# Patient Record
Sex: MALE | Race: WHITE | Employment: FULL TIME | URBAN - METROPOLITAN AREA
[De-identification: names, ages, dates, MRNs, and addresses within clinical notes are randomized per-mention and may not be internally consistent; named-entity substitution may affect disease eponyms.]

---

## 2022-07-07 ENCOUNTER — OFFICE VISIT (OUTPATIENT)
Dept: INTERNAL MEDICINE CLINIC | Facility: CLINIC | Age: 47
End: 2022-07-07
Payer: COMMERCIAL

## 2022-07-07 VITALS
SYSTOLIC BLOOD PRESSURE: 118 MMHG | DIASTOLIC BLOOD PRESSURE: 78 MMHG | OXYGEN SATURATION: 98 % | HEIGHT: 67 IN | TEMPERATURE: 98 F | HEART RATE: 66 BPM | BODY MASS INDEX: 27 KG/M2 | WEIGHT: 172 LBS | RESPIRATION RATE: 16 BRPM

## 2022-07-07 DIAGNOSIS — Z00.00 ROUTINE GENERAL MEDICAL EXAMINATION AT A HEALTH CARE FACILITY: Primary | ICD-10-CM

## 2022-07-07 DIAGNOSIS — Z13.220 SCREENING FOR LIPID DISORDERS: ICD-10-CM

## 2022-07-07 DIAGNOSIS — Z13.0 SCREENING FOR BLOOD DISEASE: ICD-10-CM

## 2022-07-07 DIAGNOSIS — Z13.29 SCREENING FOR THYROID DISORDER: ICD-10-CM

## 2022-07-07 DIAGNOSIS — Z13.228 SCREENING FOR METABOLIC DISORDER: ICD-10-CM

## 2024-11-27 ENCOUNTER — TELEPHONE (OUTPATIENT)
Dept: INTERNAL MEDICINE CLINIC | Facility: CLINIC | Age: 49
End: 2024-11-27

## 2024-11-27 ENCOUNTER — LAB ENCOUNTER (OUTPATIENT)
Dept: LAB | Age: 49
End: 2024-11-27
Attending: INTERNAL MEDICINE
Payer: COMMERCIAL

## 2024-11-27 DIAGNOSIS — Z13.29 SCREENING FOR THYROID DISORDER: ICD-10-CM

## 2024-11-27 DIAGNOSIS — Z12.5 SCREENING FOR MALIGNANT NEOPLASM OF PROSTATE: ICD-10-CM

## 2024-11-27 DIAGNOSIS — Z13.220 SCREENING FOR LIPID DISORDERS: ICD-10-CM

## 2024-11-27 DIAGNOSIS — Z00.00 ROUTINE GENERAL MEDICAL EXAMINATION AT A HEALTH CARE FACILITY: ICD-10-CM

## 2024-11-27 DIAGNOSIS — Z00.00 ROUTINE GENERAL MEDICAL EXAMINATION AT A HEALTH CARE FACILITY: Primary | ICD-10-CM

## 2024-11-27 DIAGNOSIS — Z13.0 ENCOUNTER FOR SCREENING FOR DISEASES OF THE BLOOD AND BLOOD-FORMING ORGANS AND CERTAIN DISORDERS INVOLVING THE IMMUNE MECHANISM: ICD-10-CM

## 2024-11-27 DIAGNOSIS — Z13.228 SCREENING FOR METABOLIC DISORDER: ICD-10-CM

## 2024-11-27 DIAGNOSIS — Z13.0 SCREENING FOR DISORDER OF BLOOD AND BLOOD-FORMING ORGANS: ICD-10-CM

## 2024-11-27 LAB
ALBUMIN SERPL-MCNC: 4.8 G/DL (ref 3.2–4.8)
ALBUMIN/GLOB SERPL: 1.8 {RATIO} (ref 1–2)
ALP LIVER SERPL-CCNC: 64 U/L
ALT SERPL-CCNC: 29 U/L
ANION GAP SERPL CALC-SCNC: 6 MMOL/L (ref 0–18)
AST SERPL-CCNC: 26 U/L (ref ?–34)
BASOPHILS # BLD AUTO: 0.04 X10(3) UL (ref 0–0.2)
BASOPHILS NFR BLD AUTO: 0.5 %
BILIRUB SERPL-MCNC: 0.4 MG/DL (ref 0.3–1.2)
BUN BLD-MCNC: 11 MG/DL (ref 9–23)
CALCIUM BLD-MCNC: 9.8 MG/DL (ref 8.7–10.4)
CHLORIDE SERPL-SCNC: 106 MMOL/L (ref 98–112)
CHOLEST SERPL-MCNC: 189 MG/DL (ref ?–200)
CO2 SERPL-SCNC: 30 MMOL/L (ref 21–32)
CREAT BLD-MCNC: 0.96 MG/DL
EGFRCR SERPLBLD CKD-EPI 2021: 98 ML/MIN/1.73M2 (ref 60–?)
EOSINOPHIL # BLD AUTO: 0.29 X10(3) UL (ref 0–0.7)
EOSINOPHIL NFR BLD AUTO: 3.4 %
ERYTHROCYTE [DISTWIDTH] IN BLOOD BY AUTOMATED COUNT: 12.8 %
FASTING PATIENT LIPID ANSWER: YES
FASTING STATUS PATIENT QL REPORTED: YES
GLOBULIN PLAS-MCNC: 2.6 G/DL (ref 2–3.5)
GLUCOSE BLD-MCNC: 81 MG/DL (ref 70–99)
HCT VFR BLD AUTO: 45 %
HDLC SERPL-MCNC: 75 MG/DL (ref 40–59)
HGB BLD-MCNC: 14.8 G/DL
IMM GRANULOCYTES # BLD AUTO: 0.03 X10(3) UL (ref 0–1)
IMM GRANULOCYTES NFR BLD: 0.3 %
LDLC SERPL CALC-MCNC: 105 MG/DL (ref ?–100)
LYMPHOCYTES # BLD AUTO: 3.02 X10(3) UL (ref 1–4)
LYMPHOCYTES NFR BLD AUTO: 35 %
MCH RBC QN AUTO: 30 PG (ref 26–34)
MCHC RBC AUTO-ENTMCNC: 32.9 G/DL (ref 31–37)
MCV RBC AUTO: 91.3 FL
MONOCYTES # BLD AUTO: 0.64 X10(3) UL (ref 0.1–1)
MONOCYTES NFR BLD AUTO: 7.4 %
NEUTROPHILS # BLD AUTO: 4.61 X10 (3) UL (ref 1.5–7.7)
NEUTROPHILS # BLD AUTO: 4.61 X10(3) UL (ref 1.5–7.7)
NEUTROPHILS NFR BLD AUTO: 53.4 %
NONHDLC SERPL-MCNC: 114 MG/DL (ref ?–130)
OSMOLALITY SERPL CALC.SUM OF ELEC: 292 MOSM/KG (ref 275–295)
PLATELET # BLD AUTO: 327 10(3)UL (ref 150–450)
POTASSIUM SERPL-SCNC: 4.5 MMOL/L (ref 3.5–5.1)
PROT SERPL-MCNC: 7.4 G/DL (ref 5.7–8.2)
RBC # BLD AUTO: 4.93 X10(6)UL
SODIUM SERPL-SCNC: 142 MMOL/L (ref 136–145)
TRIGL SERPL-MCNC: 45 MG/DL (ref 30–149)
TSI SER-ACNC: 2.47 UIU/ML (ref 0.55–4.78)
VLDLC SERPL CALC-MCNC: 8 MG/DL (ref 0–30)
WBC # BLD AUTO: 8.6 X10(3) UL (ref 4–11)

## 2024-11-27 PROCEDURE — 80061 LIPID PANEL: CPT | Performed by: INTERNAL MEDICINE

## 2024-11-27 PROCEDURE — 80050 GENERAL HEALTH PANEL: CPT | Performed by: INTERNAL MEDICINE

## 2024-11-27 NOTE — TELEPHONE ENCOUNTER
Patient called request labs prior to their annual physical.  Annual physical scheduled for 7/7/22 .   Please order labs. Patient preferred lab is [facility]  Patient informed request was sent to clinical team.  Patient informed to fast for labs.  No callback required.

## 2024-12-16 ENCOUNTER — OFFICE VISIT (OUTPATIENT)
Dept: INTERNAL MEDICINE CLINIC | Facility: CLINIC | Age: 49
End: 2024-12-16
Payer: COMMERCIAL

## 2024-12-16 VITALS
HEART RATE: 72 BPM | SYSTOLIC BLOOD PRESSURE: 106 MMHG | RESPIRATION RATE: 23 BRPM | TEMPERATURE: 98 F | BODY MASS INDEX: 27.32 KG/M2 | HEIGHT: 67.72 IN | WEIGHT: 178.19 LBS | OXYGEN SATURATION: 98 % | DIASTOLIC BLOOD PRESSURE: 60 MMHG

## 2024-12-16 DIAGNOSIS — Z23 NEED FOR VACCINATION: ICD-10-CM

## 2024-12-16 DIAGNOSIS — Z00.00 ANNUAL PHYSICAL EXAM: Primary | ICD-10-CM

## 2024-12-16 DIAGNOSIS — B35.1 ONYCHOMYCOSIS OF GREAT TOE: ICD-10-CM

## 2024-12-16 PROCEDURE — 99396 PREV VISIT EST AGE 40-64: CPT

## 2024-12-16 PROCEDURE — 3008F BODY MASS INDEX DOCD: CPT

## 2024-12-16 PROCEDURE — 90471 IMMUNIZATION ADMIN: CPT

## 2024-12-16 PROCEDURE — 3074F SYST BP LT 130 MM HG: CPT

## 2024-12-16 PROCEDURE — 3078F DIAST BP <80 MM HG: CPT

## 2024-12-16 PROCEDURE — 90715 TDAP VACCINE 7 YRS/> IM: CPT

## 2024-12-16 PROCEDURE — 90472 IMMUNIZATION ADMIN EACH ADD: CPT

## 2024-12-16 RX ORDER — CICLOPIROX 80 MG/ML
SOLUTION TOPICAL
Qty: 6.6 ML | Refills: 0 | Status: SHIPPED | OUTPATIENT
Start: 2024-12-16

## 2024-12-16 NOTE — PROGRESS NOTES
Jeremiah Marks is a 49 year old male who presents for a complete physical exam.     HPI:   Patient current concerns are discoloration and thickening of left foot toe nail- great toe. Patient has tried otc remedies with no improvement. 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 Overall feeling well. He reports well balanced diet. He has questions on future concerns with kidney stones found on CTA during transplant work up- he had evaluation to be a possible kidney donor for his wife which he was denied due to stones. No current or previous symptoms- incidental finding on imaging. Discussed not a concern until symptoms occur or if kidney function was abnormal. Patient overall doing well.     Wt Readings from Last 6 Encounters:   12/16/24 178 lb 3.2 oz (80.8 kg)   07/07/22 172 lb (78 kg)     Cholesterol, Total (mg/dL)   Date Value   11/27/2024 189     HDL Cholesterol (mg/dL)   Date Value   11/27/2024 75 (H)     LDL Cholesterol (mg/dL)   Date Value   11/27/2024 105 (H)     AST (U/L)   Date Value   11/27/2024 26     ALT (U/L)   Date Value   11/27/2024 29      Current Outpatient Medications   Medication Sig Dispense Refill    Ciclopirox 8 % External Solution Use nightly 6.6 mL 0      History reviewed. No pertinent past medical history.   Past Surgical History:   Procedure Laterality Date    Anterior cruciate ligament repair Bilateral     age 22s      Family History   Problem Relation Age of Onset    Diabetes Father     Thyroid disease Mother     Diabetes Maternal Grandmother     Heart Disorder Maternal Grandmother     Hypertension Maternal Grandmother     Diabetes Paternal Grandmother            Health Maintenance  Immunizations: agreeable to tdap. Covid vaccine available at local pharmacy  Immunization History   Administered Date(s) Administered    Covid-19 Vaccine Pfizer 30 mcg/0.3 ml 03/09/2021, 03/30/2021, 12/30/2021    Influenza 10/15/2024    TDAP 12/16/2024   Dental Visits:  last week  Colon  cancer [id number] Dr Vo  Health Maintenance   Topic Date Due    Colorectal Cancer Screening  2032     REVIEW OF SYSTEMS:   GENERAL: feels well otherwise  SKIN: denies any unusual skin lesions  EYES:denies blurred vision or double vision  HEENT: denies nasal congestion, sinus pain or ear discomfort  LUNGS: denies shortness of breath with exertion  CARDIOVASCULAR: denies chest pain on exertion  GI: denies abdominal pain,denies heartburn  : denies nocturia or changes in stream  MUSCULOSKELETAL: denies back pain  NEURO: denies headaches  PSYCH: denies depression or anxiety    EXAM:   /60   Pulse 72   Temp 98 °F (36.7 °C) (Temporal)   Resp 23   Ht 5' 7.72\" (1.72 m)   Wt 178 lb 3.2 oz (80.8 kg)   SpO2 98%   BMI 27.32 kg/m²   Body mass index is 27.32 kg/m².   GENERAL: well developed, well nourished,in no apparent distress  SKIN: no rashes,no suspicious lesions  HEENT: atraumatic, normocephalic,ears and throat are clear  EYES:PERRLA, EOMI, conjunctiva are clear  NECK: supple,no adenopathy,  CHEST: no chest tenderness  BREAST: no dominant or suspicious mass  LUNGS: clear to auscultation  CARDIO: RRR without murmur  GI: normal BS, soft, no masses, HSM or tenderness  : deferred  RECTAL: deferred  MUSCULOSKELETAL: back is not tender  EXTREMITIES: no edema  NEURO: Oriented times three,cranial nerves are intact,motor and sensory are grossly intact    ASSESSMENT AND PLAN:   Jeremiah Marks is a 49 year old male who presents for a complete physical exam.   1. Annual physical exam  Return in 1 year or sooner for acute visit    2. Onychomycosis of great toe  [place], can obtain with goodrx if not covered by insurance    3. Need for vaccination    - Tdap (Adacel, Boostrix) [[id number]]    Health Maintenance Due   Topic Date Due    Colorectal Cancer Screening  Never done    DTaP,Tdap,and Td Vaccines (1 - Tdap) Never done    Annual Physical  2023    Annual Depression Screening  Never done     COVID-19 Vaccine (4 - 2024-25 season) 09/01/2024      Meds & Refills for this Visit:  Requested Prescriptions     Signed Prescriptions Disp Refills    Ciclopirox 8 % External Solution 6.6 mL 0     Sig: Use nightly     Imaging & Consults:  TETANUS, DIPHTHERIA TOXOIDS AND ACELLULAR PERTUSIS VACCINE (TDAP), >7 YEARS, IM USE    Return in about 1 year (around 12/16/2025).  There are no Patient Instructions on file for this visit.    MAYRA Alfredo